# Patient Record
Sex: MALE | Race: WHITE | HISPANIC OR LATINO | Employment: FULL TIME | URBAN - METROPOLITAN AREA
[De-identification: names, ages, dates, MRNs, and addresses within clinical notes are randomized per-mention and may not be internally consistent; named-entity substitution may affect disease eponyms.]

---
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
## 2022-03-10 ENCOUNTER — OFFICE VISIT (OUTPATIENT)
Dept: MEDICAL GROUP | Facility: MEDICAL CENTER | Age: 22
End: 2022-03-10
Attending: INTERNAL MEDICINE
Payer: MEDICAID

## 2022-03-10 VITALS
HEART RATE: 82 BPM | WEIGHT: 209 LBS | DIASTOLIC BLOOD PRESSURE: 78 MMHG | OXYGEN SATURATION: 100 % | TEMPERATURE: 97.9 F | RESPIRATION RATE: 16 BRPM | SYSTOLIC BLOOD PRESSURE: 138 MMHG | HEIGHT: 69 IN | BODY MASS INDEX: 30.96 KG/M2

## 2022-03-10 DIAGNOSIS — I10 PRIMARY HYPERTENSION: ICD-10-CM

## 2022-03-10 PROBLEM — Z22.7 TB LUNG, LATENT: Status: RESOLVED | Noted: 2021-09-03 | Resolved: 2022-03-10

## 2022-03-10 PROCEDURE — 99213 OFFICE O/P EST LOW 20 MIN: CPT | Performed by: INTERNAL MEDICINE

## 2022-03-10 NOTE — PROGRESS NOTES
Subjective:   Trav Ledesma Jr. is a 21 y.o. male here today for f/u HTN    Hypertension  He presents for follow-up hypertension.  He has been checking his blood pressure infrequently but he reports most of his readings have been in the 130s over 70s to 80s.  This is consistent with his reading in office today.  He continues on amlodipine 5 mg daily and enalapril 20 mg twice daily.  He has been physically active and going to the gym multiple days a week.  He denies chest pain, leg swelling, shortness of breath.       Current medicines (including changes today)  Current Outpatient Medications   Medication Sig Dispense Refill   • enalapril (VASOTEC) 20 MG tablet Take 1 Tablet by mouth 2 times a day. 60 Tablet 11   • amLODIPine (NORVASC) 5 MG Tab Take 1 tablet by mouth once daily 90 Tablet 3     No current facility-administered medications for this visit.     He  has a past medical history of Hypertension.         Objective:     Vitals:    03/10/22 1358   BP: 138/78   Pulse: 82   Resp: 16   Temp: 36.6 °C (97.9 °F)   SpO2: 100%     Body mass index is 31.06 kg/m².   Physical Exam:  Constitutional: Alert, no distress.  Skin: Warm, dry, good turgor, no rashes in visible areas.  Eye: Equal, round and reactive, conjunctiva clear, lids normal.  Respiratory: Unlabored respiratory effort, lungs clear to auscultation, no wheezes, no ronchi.  Cardiovascular: Regular rate and rhythm, no murmurs appreciated, no lower extremity edema  Psych: Alert and oriented x3, normal affect and mood.      Assessment and Plan:   The following treatment plan was discussed    1. Primary hypertension  Stable, controlled with current medication.  We discussed periodic home monitoring and he will notify me if he starts to see numbers greater than 140/90.  Would add on low-dose of diuretic if needed in future.  We will continue current meds for now with 6-month follow-up and will obtain yearly CMP.  -Continue enalapril 20 mg twice daily,  amlodipine 5 mg daily  - Comp Metabolic Panel; Future        Followup: Return in about 6 months (around 9/10/2022), or if symptoms worsen or fail to improve, for hypertension.

## 2022-03-10 NOTE — ASSESSMENT & PLAN NOTE
He presents for follow-up hypertension.  He has been checking his blood pressure infrequently but he reports most of his readings have been in the 130s over 70s to 80s.  This is consistent with his reading in office today.  He continues on amlodipine 5 mg daily and enalapril 20 mg twice daily.  He has been physically active and going to the gym multiple days a week.  He denies chest pain, leg swelling, shortness of breath.

## 2022-09-09 ENCOUNTER — OFFICE VISIT (OUTPATIENT)
Dept: MEDICAL GROUP | Facility: MEDICAL CENTER | Age: 22
End: 2022-09-09
Attending: INTERNAL MEDICINE
Payer: MEDICAID

## 2022-09-09 VITALS
BODY MASS INDEX: 27.11 KG/M2 | HEIGHT: 69 IN | OXYGEN SATURATION: 97 % | DIASTOLIC BLOOD PRESSURE: 80 MMHG | RESPIRATION RATE: 16 BRPM | SYSTOLIC BLOOD PRESSURE: 128 MMHG | TEMPERATURE: 97.9 F | HEART RATE: 88 BPM | WEIGHT: 183 LBS

## 2022-09-09 DIAGNOSIS — S82.391D CLOSED FRACTURE OF POSTERIOR MALLEOLUS OF RIGHT TIBIA WITH ROUTINE HEALING, SUBSEQUENT ENCOUNTER: ICD-10-CM

## 2022-09-09 DIAGNOSIS — I10 ESSENTIAL HYPERTENSION: ICD-10-CM

## 2022-09-09 PROBLEM — S82.399A FRACTURE OF POSTERIOR MALLEOLUS: Status: ACTIVE | Noted: 2022-08-29

## 2022-09-09 PROCEDURE — 99212 OFFICE O/P EST SF 10 MIN: CPT | Performed by: INTERNAL MEDICINE

## 2022-09-09 PROCEDURE — 99213 OFFICE O/P EST LOW 20 MIN: CPT | Performed by: INTERNAL MEDICINE

## 2022-09-09 RX ORDER — ENALAPRIL MALEATE 20 MG/1
20 TABLET ORAL 2 TIMES DAILY
Qty: 180 TABLET | Refills: 3 | Status: SHIPPED | OUTPATIENT
Start: 2022-09-09

## 2022-09-09 RX ORDER — AMLODIPINE BESYLATE 5 MG/1
TABLET ORAL
Qty: 90 TABLET | Refills: 3 | Status: SHIPPED | OUTPATIENT
Start: 2022-09-09

## 2022-09-09 NOTE — ASSESSMENT & PLAN NOTE
He reports severely rolling his ankle approximately 3 weeks ago after which he sustained a posterior malleolus fracture.  He has been following with [facility] orthopedics and is currently in a boot and nonweightbearing on crutches.  He has follow-up with them next week for repeat imaging.  States that the ankle has not been painful and he has not needed to take anything for it.

## 2022-09-09 NOTE — PROGRESS NOTES
Subjective:   Trav Ledesma Jr. is a 22 y.o. male here today for f/u HTN    Fracture of posterior malleolus  He reports severely rolling his ankle approximately 3 weeks ago after which he sustained a posterior malleolus fracture.  He has been following with [facility] orthopedics and is currently in a boot and nonweightbearing on crutches.  He has follow-up with them next week for repeat imaging.  States that the ankle has not been painful and he has not needed to take anything for it.    Hypertension  He presents for follow-up hypertension.  He continues on amlodipine 5 mg daily and enalapril 20 mg twice daily.  He reports good blood pressure control at home with the majority of his readings in the 120s over 60s to 80s.  He reports the highest he has seen was 136 systolic but this is very rare.  Overall, he is feeling well and before breaking his ankle he was exercising regularly.  He has lost about 25 pounds since his last visit and he has been running.     Current medicines (including changes today)  Current Outpatient Medications   Medication Sig Dispense Refill    enalapril (VASOTEC) 20 MG tablet Take 1 Tablet by mouth 2 times a day. 180 Tablet 3    amLODIPine (NORVASC) 5 MG Tab Take 1 tablet by mouth once daily 90 Tablet 3     No current facility-administered medications for this visit.     He  has a past medical history of Hypertension.         Objective:     Vitals:    09/09/22 0907   BP: 128/80   Pulse: 88   Resp: 16   Temp: 36.6 °C (97.9 °F)   SpO2: 97%     Body mass index is 27.2 kg/m².   Physical Exam:  Constitutional: Alert, no distress.  Skin: Warm, dry, good turgor, no rashes in visible areas.  Eye: Equal, round and reactive, conjunctiva clear, lids normal.  Respiratory: Unlabored respiratory effort, lungs clear to auscultation, no wheezes, no ronchi.  Cardiovascular: Regular rate and rhythm, no murmurs appreciated, no lower  Psych: Alert and oriented x3, normal affect and mood.  Extrem:  wearing boot on R         Assessment and Plan:   The following treatment plan was discussed    1. Essential hypertension  Stable and well-controlled on current medications which were refilled today.  He will continue with periodic home blood pressure monitoring.  Encouraged him to obtain CMP which was ordered at last visit.  - enalapril (VASOTEC) 20 MG tablet; Take 1 Tablet by mouth 2 times a day.  Dispense: 180 Tablet; Refill: 3  - amLODIPine (NORVASC) 5 MG Tab; Take 1 tablet by mouth once daily  Dispense: 90 Tablet; Refill: 3    2. Closed fracture of posterior malleolus of right tibia with routine healing, subsequent encounter  Stable, he will continue follow-up with orthopedics       Followup: Return in about 1 year (around 9/9/2023) for hypertension, annual.

## 2022-09-09 NOTE — ASSESSMENT & PLAN NOTE
He presents for follow-up hypertension.  He continues on amlodipine 5 mg daily and enalapril 20 mg twice daily.  He reports good blood pressure control at home with the majority of his readings in the 120s over 60s to 80s.  He reports the highest he has seen was 136 systolic but this is very rare.  Overall, he is feeling well and before breaking his ankle he was exercising regularly.  He has lost about 25 pounds since his last visit and he has been running.

## 2023-04-06 ENCOUNTER — OFFICE VISIT (OUTPATIENT)
Dept: MEDICAL GROUP | Facility: MEDICAL CENTER | Age: 23
End: 2023-04-06
Attending: INTERNAL MEDICINE
Payer: MEDICAID

## 2023-04-06 VITALS
BODY MASS INDEX: 29.33 KG/M2 | WEIGHT: 198 LBS | RESPIRATION RATE: 16 BRPM | HEIGHT: 69 IN | TEMPERATURE: 97.5 F | OXYGEN SATURATION: 96 % | HEART RATE: 72 BPM | DIASTOLIC BLOOD PRESSURE: 80 MMHG | SYSTOLIC BLOOD PRESSURE: 138 MMHG

## 2023-04-06 DIAGNOSIS — Z86.11 H/O TB (TUBERCULOSIS): ICD-10-CM

## 2023-04-06 DIAGNOSIS — I10 PRIMARY HYPERTENSION: ICD-10-CM

## 2023-04-06 PROBLEM — S82.399A FRACTURE OF POSTERIOR MALLEOLUS: Status: RESOLVED | Noted: 2022-08-29 | Resolved: 2023-04-06

## 2023-04-06 PROCEDURE — 99214 OFFICE O/P EST MOD 30 MIN: CPT | Performed by: INTERNAL MEDICINE

## 2023-04-06 PROCEDURE — 99212 OFFICE O/P EST SF 10 MIN: CPT | Performed by: INTERNAL MEDICINE

## 2023-04-06 NOTE — ASSESSMENT & PLAN NOTE
He presents today for follow-up hypertension.  Blood pressure in clinic today is 138/80 but he states this is the highest it has been.  He monitors it intermittently at home and it is usually in the 120s to low 130s over 70s to 80s.  He continues on enalapril 20 mg twice daily and amlodipine 5 mg daily.  Reports getting blood work done 1 week ago for his job and believes they checked his kidney and liver function.

## 2023-04-06 NOTE — ASSESSMENT & PLAN NOTE
Patient was treated for latent TB he believes in 2021 with 3 months of medication.  He now has a job which requires him to get an updated chest x-ray which he is requesting today.  States they do not need any blood testing for him.  He is currently asymptomatic.

## 2023-04-06 NOTE — PROGRESS NOTES
Subjective:   Trav Ledesma Jr. is a 22 y.o. male here today for f/u HTN, TB testing    H/O TB (tuberculosis)  Patient was treated for latent TB he believes in 2021 with 3 months of medication.  He now has a job which requires him to get an updated chest x-ray which he is requesting today.  States they do not need any blood testing for him.  He is currently asymptomatic.      Hypertension  He presents today for follow-up hypertension.  Blood pressure in clinic today is 138/80 but he states this is the highest it has been.  He monitors it intermittently at home and it is usually in the 120s to low 130s over 70s to 80s.  He continues on enalapril 20 mg twice daily and amlodipine 5 mg daily.  Reports getting blood work done 1 week ago for his job and believes they checked his kidney and liver function.       Current medicines (including changes today)  Current Outpatient Medications   Medication Sig Dispense Refill    enalapril (VASOTEC) 20 MG tablet Take 1 Tablet by mouth 2 times a day. 180 Tablet 3    amLODIPine (NORVASC) 5 MG Tab Take 1 tablet by mouth once daily 90 Tablet 3     No current facility-administered medications for this visit.     He  has a past medical history of Hypertension.         Objective:     Vitals:    04/06/23 1111   BP: 138/80   Pulse: 72   Resp: 16   Temp: 36.4 °C (97.5 °F)   SpO2: 96%     Body mass index is 29.43 kg/m².   Physical Exam:  Constitutional: Alert, no distress.  Skin: Warm, dry, good turgor, no rashes in visible areas.  Respiratory: Unlabored respiratory effort, lungs clear to auscultation, no wheezes, no ronchi.  Cardiovascular: Regular rate and rhythm, no murmurs  Psych: Alert and oriented x3, normal affect and mood.      Assessment and Plan:   The following treatment plan was discussed    1. Primary hypertension  Stable and controlled based on home blood pressure readings.  We will continue current therapy.  [facility]  -Continue  Vasotec 20 mg twice daily, amlodipine 5 mg daily    2. H/O TB (tuberculosis)  Chest x-ray ordered at request of his employer  - DX-CHEST-2 VIEWS; Future        Followup: Return in about 1 year (around 4/6/2024) for hypertension.

## 2023-04-06 NOTE — LETTER
[facility]  Krista Arriaza M.D.  [address]  [place] [address]  Fax: [phone]   Authorization for Release/Disclosure of   Protected Health Information   Name: JIM LEDESMA Misael : 2000 SSN: [id number]   Address: [address]  [place] [address] Phone:    [phone] (home)    I authorize the entity listed below to release/disclose the PHI below to:   [facility]/Krista Arriaza M.D. and Krista Arriaza M.D.   Provider or Entity Name:  [facility]   Address   City, State, Zip   Phone:      Fax:     Reason for request: continuity of care   Information to be released:    [  ] LAST COLONOSCOPY,  including any PATH REPORT and follow-up  [  ] LAST FIT/COLOGUARD RESULT [  ] LAST DEXA  [  ] LAST MAMMOGRAM  [  ] LAST PAP  [  ] LAST LABS [  ] RETINA EXAM REPORT  [  ] IMMUNIZATION RECORDS  [  ] Release all info      [  ] Check here and initial the line next to each item to release ALL health information INCLUDING  _____ Care and treatment for drug and / or alcohol abuse  _____ HIV testing, infection status, or AIDS  _____ Genetic Testing    DATES OF SERVICE OR TIME PERIOD TO BE DISCLOSED: _____________  I understand and acknowledge that:  * This Authorization may be revoked at any time by you in writing, except if your health information has already been used or disclosed.  * Your health information that will be used or disclosed as a result of you signing this authorization could be re-disclosed by the recipient. If this occurs, your re-disclosed health information may no longer be protected by State or Federal laws.  * You may refuse to sign this Authorization. Your refusal will not affect your ability to obtain treatment.  * This Authorization becomes effective upon signing and will  on (date) __________.      If no date is indicated, this Authorization will  one (1) year from the signature date.    Name: Jim Ledesma JrMisael  Signature: Date:   2023      PLEASE FAX REQUESTED RECORDS BACK TO: [phone]

## 2023-04-10 ENCOUNTER — HOSPITAL ENCOUNTER (OUTPATIENT)
Dept: RADIOLOGY | Facility: MEDICAL CENTER | Age: 23
End: 2023-04-10
Attending: INTERNAL MEDICINE
Payer: MEDICAID

## 2023-04-10 DIAGNOSIS — Z86.11 H/O TB (TUBERCULOSIS): ICD-10-CM

## 2023-04-10 PROCEDURE — 71045 X-RAY EXAM CHEST 1 VIEW: CPT
